# Patient Record
Sex: MALE | Race: WHITE | NOT HISPANIC OR LATINO | Employment: FULL TIME | URBAN - METROPOLITAN AREA
[De-identification: names, ages, dates, MRNs, and addresses within clinical notes are randomized per-mention and may not be internally consistent; named-entity substitution may affect disease eponyms.]

---

## 2021-04-06 DIAGNOSIS — Z23 ENCOUNTER FOR IMMUNIZATION: ICD-10-CM

## 2021-06-17 LAB — HCV AB SER-ACNC: NEGATIVE

## 2023-03-15 NOTE — PROGRESS NOTES
Assessment/Plan:    No problem-specific Assessment & Plan notes found for this encounter  Diagnoses and all orders for this visit:    Acute sinusitis, recurrence not specified, unspecified location  -     amoxicillin-clavulanate (Augmentin) 875-125 mg per tablet; Take 1 tablet by mouth every 12 (twelve) hours for 10 days    Congestion of nasal sinus  -     guaiFENesin (MUCINEX) 600 mg 12 hr tablet; Take 1 tablet (600 mg total) by mouth every 12 (twelve) hours    Acute cough  -     guaiFENesin (MUCINEX) 600 mg 12 hr tablet; Take 1 tablet (600 mg total) by mouth every 12 (twelve) hours    Postnasal drip  -     guaiFENesin (MUCINEX) 600 mg 12 hr tablet; Take 1 tablet (600 mg total) by mouth every 12 (twelve) hours           Patient is a 51-year-old male presenting with sinus pressure, congestion, cough, postnasal drip for the past 6 to 7 days  His symptoms are most consistent with a acute sinusitis versus other upper respiratory infection versus bronchitis  He has had the symptoms lingering which are waxing and waning over the past several months  Due to his current presentation I will treat him for bacterial sinusitis with Augmentin twice daily for 10 days  I will also prescribe patient Mucinex from office supply to start as needed for congestion  Recommend to hydrate frequently with 6 to 8 glasses of water daily  Make sure to have food with antibiotic and do not mix alcohol while on the antibiotic  Patient is in agreement with the plan  Follow-up with regular PCP or in our office if not improving after treatment course  Recommend supportive care with fluids, rest, flonase 1-2 sprays each nostril, otc claritin or zyrtec daily and mucinex as directed  Tylenol recommended prn for pain or fever  Instructed pt RTO if symptoms persist or worsen over the course of the next week    20 minutes spent on exam and plan of care  This note was dictated using software      Subjective:      Patient ID: Jarrett Graykatie Alek Marquez is a 62 y o  male  HPI    The following portions of the patient's history were reviewed and updated as appropriate: allergies, current medications, past family history, past medical history, past social history, past surgical history and problem list     Sinus Pain  Patient complains of congestion, cough, facial pain, nasal congestion, post nasal drip and sinus pressure  Onset of symptoms was 6 days ago  Symptoms have been gradually worsening since that time  He is drinking plenty of fluids  Past history is significant for nothing  Patient is non-smoker  Patient has a history of sinus infections that occur once every year  He has been ill on and off for the past 2 months  He states that he will get sinus type symptoms which will then slowly resolve over time and then come back several days to a week later  This most recent episode has been going on since the start of last weekend and around Friday or Saturday  He states that it has been slowly getting worse and he has facial pressure and starting to have green-yellow purulent discharge  Review of Systems   HENT: Positive for congestion, postnasal drip and sinus pressure  Negative for ear pain and sore throat  Respiratory: Positive for cough  Negative for shortness of breath  Cardiovascular: Negative for chest pain  Gastrointestinal: Negative for abdominal pain, constipation and diarrhea  Skin: Negative for rash  Objective:      /78 (BP Location: Left arm, Patient Position: Sitting, Cuff Size: Standard)   Pulse 77   Temp 97 9 °F (36 6 °C)   Wt 89 kg (196 lb 3 2 oz)   SpO2 98%          Physical Exam  Vitals and nursing note reviewed  Constitutional:       General: He is not in acute distress  Appearance: Normal appearance  He is ill-appearing  HENT:      Head: Normocephalic and atraumatic  Right Ear: Tympanic membrane, ear canal and external ear normal  There is no impacted cerumen        Left Ear: Tympanic membrane, ear canal and external ear normal  There is no impacted cerumen  Nose: Congestion and rhinorrhea present  Mouth/Throat:      Mouth: Mucous membranes are moist       Pharynx: Oropharynx is clear  No oropharyngeal exudate or posterior oropharyngeal erythema  Comments: Postnasal drip  Eyes:      Extraocular Movements: Extraocular movements intact  Conjunctiva/sclera: Conjunctivae normal       Pupils: Pupils are equal, round, and reactive to light  Cardiovascular:      Rate and Rhythm: Normal rate and regular rhythm  Heart sounds: Normal heart sounds  No murmur heard  No friction rub  No gallop  Pulmonary:      Effort: Pulmonary effort is normal  No respiratory distress  Breath sounds: Normal breath sounds  No wheezing  Musculoskeletal:         General: Normal range of motion  Cervical back: Normal range of motion  Skin:     General: Skin is warm and dry  Findings: No rash  Neurological:      General: No focal deficit present  Mental Status: He is alert and oriented to person, place, and time  Mental status is at baseline  Psychiatric:         Mood and Affect: Mood normal          Behavior: Behavior normal          Thought Content:  Thought content normal          Judgment: Judgment normal

## 2023-03-16 ENCOUNTER — OFFICE VISIT (OUTPATIENT)
Age: 58
End: 2023-03-16

## 2023-03-16 ENCOUNTER — TELEPHONE (OUTPATIENT)
Dept: ADMINISTRATIVE | Facility: OTHER | Age: 58
End: 2023-03-16

## 2023-03-16 VITALS
HEART RATE: 77 BPM | TEMPERATURE: 97.9 F | WEIGHT: 196.2 LBS | DIASTOLIC BLOOD PRESSURE: 78 MMHG | OXYGEN SATURATION: 98 % | SYSTOLIC BLOOD PRESSURE: 130 MMHG

## 2023-03-16 DIAGNOSIS — R05.1 ACUTE COUGH: ICD-10-CM

## 2023-03-16 DIAGNOSIS — J01.90 ACUTE SINUSITIS, RECURRENCE NOT SPECIFIED, UNSPECIFIED LOCATION: Primary | ICD-10-CM

## 2023-03-16 DIAGNOSIS — R09.81 CONGESTION OF NASAL SINUS: ICD-10-CM

## 2023-03-16 DIAGNOSIS — R09.82 POSTNASAL DRIP: ICD-10-CM

## 2023-03-16 RX ORDER — GUAIFENESIN 600 MG/1
600 TABLET, EXTENDED RELEASE ORAL EVERY 12 HOURS SCHEDULED
Qty: 20 TABLET | Refills: 0
Start: 2023-03-16

## 2023-03-16 RX ORDER — AMOXICILLIN AND CLAVULANATE POTASSIUM 875; 125 MG/1; MG/1
1 TABLET, FILM COATED ORAL EVERY 12 HOURS SCHEDULED
Qty: 20 TABLET | Refills: 0
Start: 2023-03-16 | End: 2023-03-26

## 2023-03-16 NOTE — TELEPHONE ENCOUNTER
----- Message from [place], [address] sent at 3/16/2023  7:09 AM EDT -----  Regarding: Care Gap request  03/16/23 7:09 AM    Hello, our patient attached above has had Hepatitis C completed/performed  Please assist in updating the patient chart by pulling the Care Everywhere (CE) document  The date of service is 2021         Thank you,  [place]  [facility] [phone] [facility]

## 2023-03-16 NOTE — TELEPHONE ENCOUNTER
Upon review of the In Basket request we were able to locate, review, and update the patient chart as requested for Hepatitis C   Any additional questions or concerns should be emailed to the Practice Liaisons via the appropriate education email address, please do not reply via In Basket      Thank you  Paulo Nageotte, MA

## 2023-07-20 ENCOUNTER — CLINICAL SUPPORT (OUTPATIENT)
Age: 58
End: 2023-07-20

## 2023-07-20 DIAGNOSIS — M25.50 MULTIPLE JOINT PAIN: ICD-10-CM

## 2023-07-20 DIAGNOSIS — E66.3 OVERWEIGHT (BMI 25.0-29.9): ICD-10-CM

## 2023-07-20 DIAGNOSIS — Z00.00 ANNUAL PHYSICAL EXAM: ICD-10-CM

## 2023-07-20 PROCEDURE — 36415 COLL VENOUS BLD VENIPUNCTURE: CPT

## 2023-07-20 NOTE — PROGRESS NOTES
Assessment/Plan:    No problem-specific Assessment & Plan notes found for this encounter. Diagnoses and all orders for this visit:    Tinea cruris  -     clotrimazole-betamethasone (LOTRISONE) 1-0.05 % cream; Apply topically 2 (two) times a day    Rash  -     clotrimazole-betamethasone (LOTRISONE) 1-0.05 % cream; Apply topically 2 (two) times a day    Hyperlipidemia, unspecified hyperlipidemia type  -     ezetimibe (ZETIA) 10 mg tablet; Take 1 tablet (10 mg total) by mouth daily  -     Lipid panel; Future  -     Comprehensive metabolic panel; Future          Patient is a 68-year-old male presenting for rash today and to discuss cholesterol. Will prescribe clotrimazole cream for rash. We discussed managing cholesterol with medication versus diet and exercise. Patient agreed to start zetia. Side effects were discussed in detail. Patient reports a family history of severe side effects to statins as his mother went into liver failure and had extremely high bilirubin and liver enzyme levels. He does not want to go on a statin and risk having those side effects. I recommend a healthy diet and exercise in addition to Zetia. 30 minutes spent on physical exam and plan of care. This note was dictated using software. Subjective:      Patient ID: Ailyn Wray is a 62 y.o. male. HPI    The following portions of the patient's history were reviewed and updated as appropriate: allergies, current medications, past family history, past medical history, past social history, past surgical history and problem list.    Patient presents for evaluation of a rash involving the left medial leg. Rash started last week. Lesions are red, and flat in texture. Rash has not changed over time. Rash causes no discomfort. Associated symptoms: none. Patient denies: congestion, cough and nausea. Patient has not had contacts with similar rash.  Patient has not had new exposures (soaps, lotions, laundry detergents, foods, medications, plants, insects or animals). We also discussed elevated ascvd risk today and options for lowering with medication. Review of Systems   Respiratory: Negative for shortness of breath. Cardiovascular: Negative for chest pain. Gastrointestinal: Negative for abdominal pain, constipation and diarrhea. Skin: Positive for rash. Objective:      /76 (BP Location: Left arm, Patient Position: Sitting, Cuff Size: Standard)   Pulse 66   Temp 98.5 °F (36.9 °C)   Ht 5' 11" (1.803 m)   Wt 86.2 kg (190 lb)   SpO2 99%   BMI 26.50 kg/m²          Physical Exam  Vitals and nursing note reviewed. Constitutional:       General: He is not in acute distress. Appearance: Normal appearance. He is not ill-appearing. HENT:      Head: Normocephalic and atraumatic. Right Ear: External ear normal.      Left Ear: External ear normal.      Nose: Nose normal.      Mouth/Throat:      Mouth: Mucous membranes are moist.   Eyes:      Extraocular Movements: Extraocular movements intact. Conjunctiva/sclera: Conjunctivae normal.      Pupils: Pupils are equal, round, and reactive to light. Cardiovascular:      Rate and Rhythm: Normal rate and regular rhythm. Pulmonary:      Effort: Pulmonary effort is normal. No respiratory distress. Musculoskeletal:         General: Normal range of motion. Cervical back: Normal range of motion. Skin:     General: Skin is warm. Findings: Rash (tinea rash noted to right lower leg) present. Neurological:      General: No focal deficit present. Mental Status: He is alert and oriented to person, place, and time. Mental status is at baseline. Psychiatric:         Mood and Affect: Mood normal.         Behavior: Behavior normal.         Thought Content:  Thought content normal.         Judgment: Judgment normal.

## 2023-07-21 LAB
ALBUMIN SERPL-MCNC: 4.5 G/DL (ref 3.6–5.1)
ALBUMIN/GLOB SERPL: 1.8 (CALC) (ref 1–2.5)
ALP SERPL-CCNC: 72 U/L (ref 35–144)
ALT SERPL-CCNC: 29 U/L (ref 9–46)
ANA SER QL IF: NEGATIVE
AST SERPL-CCNC: 38 U/L (ref 10–35)
BASOPHILS # BLD AUTO: 22 CELLS/UL (ref 0–200)
BASOPHILS NFR BLD AUTO: 0.5 %
BILIRUB SERPL-MCNC: 1.4 MG/DL (ref 0.2–1.2)
BUN SERPL-MCNC: 19 MG/DL (ref 7–25)
BUN/CREAT SERPL: ABNORMAL (CALC) (ref 6–22)
CALCIUM SERPL-MCNC: 9.4 MG/DL (ref 8.6–10.3)
CHLORIDE SERPL-SCNC: 104 MMOL/L (ref 98–110)
CHOLEST SERPL-MCNC: 232 MG/DL
CHOLEST/HDLC SERPL: 4.6 (CALC)
CO2 SERPL-SCNC: 24 MMOL/L (ref 20–32)
CREAT SERPL-MCNC: 1.01 MG/DL (ref 0.7–1.3)
EOSINOPHIL # BLD AUTO: 99 CELLS/UL (ref 15–500)
EOSINOPHIL NFR BLD AUTO: 2.3 %
ERYTHROCYTE [DISTWIDTH] IN BLOOD BY AUTOMATED COUNT: 12.9 % (ref 11–15)
GFR/BSA.PRED SERPLBLD CYS-BASED-ARV: 87 ML/MIN/1.73M2
GLOBULIN SER CALC-MCNC: 2.5 G/DL (CALC) (ref 1.9–3.7)
GLUCOSE SERPL-MCNC: 118 MG/DL (ref 65–99)
HCT VFR BLD AUTO: 49.3 % (ref 38.5–50)
HDLC SERPL-MCNC: 50 MG/DL
HGB BLD-MCNC: 16.1 G/DL (ref 13.2–17.1)
HIV 1+2 AB+HIV1 P24 AG SERPL QL IA: NORMAL
LDLC SERPL CALC-MCNC: 156 MG/DL (CALC)
LYMPHOCYTES # BLD AUTO: 1238 CELLS/UL (ref 850–3900)
LYMPHOCYTES NFR BLD AUTO: 28.8 %
MCH RBC QN AUTO: 31 PG (ref 27–33)
MCHC RBC AUTO-ENTMCNC: 32.7 G/DL (ref 32–36)
MCV RBC AUTO: 94.8 FL (ref 80–100)
MONOCYTES # BLD AUTO: 477 CELLS/UL (ref 200–950)
MONOCYTES NFR BLD AUTO: 11.1 %
NEUTROPHILS # BLD AUTO: 2464 CELLS/UL (ref 1500–7800)
NEUTROPHILS NFR BLD AUTO: 57.3 %
NONHDLC SERPL-MCNC: 182 MG/DL (CALC)
PLATELET # BLD AUTO: 192 THOUSAND/UL (ref 140–400)
PMV BLD REES-ECKER: 10.3 FL (ref 7.5–12.5)
POTASSIUM SERPL-SCNC: 4.9 MMOL/L (ref 3.5–5.3)
PROT SERPL-MCNC: 7 G/DL (ref 6.1–8.1)
PSA SERPL-MCNC: 0.7 NG/ML
RBC # BLD AUTO: 5.2 MILLION/UL (ref 4.2–5.8)
RHEUMATOID FACT SERPL-ACNC: <14 IU/ML
SODIUM SERPL-SCNC: 138 MMOL/L (ref 135–146)
TRIGL SERPL-MCNC: 140 MG/DL
TSH SERPL-ACNC: 1.37 MIU/L (ref 0.4–4.5)
URATE SERPL-MCNC: 5 MG/DL (ref 4–8)
WBC # BLD AUTO: 4.3 THOUSAND/UL (ref 3.8–10.8)

## 2023-07-27 ENCOUNTER — OFFICE VISIT (OUTPATIENT)
Age: 58
End: 2023-07-27

## 2023-07-27 ENCOUNTER — TELEPHONE (OUTPATIENT)
Dept: FAMILY MEDICINE CLINIC | Facility: CLINIC | Age: 58
End: 2023-07-27

## 2023-07-27 VITALS
OXYGEN SATURATION: 99 % | HEART RATE: 66 BPM | TEMPERATURE: 98.5 F | HEIGHT: 71 IN | DIASTOLIC BLOOD PRESSURE: 76 MMHG | WEIGHT: 190 LBS | BODY MASS INDEX: 26.6 KG/M2 | SYSTOLIC BLOOD PRESSURE: 130 MMHG

## 2023-07-27 DIAGNOSIS — B35.6 TINEA CRURIS: Primary | ICD-10-CM

## 2023-07-27 DIAGNOSIS — R21 RASH: ICD-10-CM

## 2023-07-27 DIAGNOSIS — E78.5 HYPERLIPIDEMIA, UNSPECIFIED HYPERLIPIDEMIA TYPE: ICD-10-CM

## 2023-07-27 PROCEDURE — 99213 OFFICE O/P EST LOW 20 MIN: CPT | Performed by: STUDENT IN AN ORGANIZED HEALTH CARE EDUCATION/TRAINING PROGRAM

## 2023-07-27 RX ORDER — EZETIMIBE 10 MG/1
10 TABLET ORAL DAILY
Qty: 90 TABLET | Refills: 3 | Status: SHIPPED | OUTPATIENT
Start: 2023-07-27 | End: 2024-07-21

## 2023-07-27 RX ORDER — CLOTRIMAZOLE AND BETAMETHASONE DIPROPIONATE 10; .64 MG/G; MG/G
CREAM TOPICAL 2 TIMES DAILY
Qty: 30 G | Refills: 0 | Status: SHIPPED | OUTPATIENT
Start: 2023-07-27

## 2023-12-22 LAB
ALBUMIN SERPL-MCNC: 4.4 G/DL (ref 3.6–5.1)
ALBUMIN/GLOB SERPL: 1.9 (CALC) (ref 1–2.5)
ALP SERPL-CCNC: 70 U/L (ref 35–144)
ALT SERPL-CCNC: 17 U/L (ref 9–46)
AST SERPL-CCNC: 19 U/L (ref 10–35)
BILIRUB SERPL-MCNC: 0.9 MG/DL (ref 0.2–1.2)
BUN SERPL-MCNC: 18 MG/DL (ref 7–25)
BUN/CREAT SERPL: NORMAL (CALC) (ref 6–22)
CALCIUM SERPL-MCNC: 9 MG/DL (ref 8.6–10.3)
CHLORIDE SERPL-SCNC: 102 MMOL/L (ref 98–110)
CHOLEST SERPL-MCNC: 176 MG/DL
CHOLEST/HDLC SERPL: 3.1 (CALC)
CO2 SERPL-SCNC: 31 MMOL/L (ref 20–32)
CREAT SERPL-MCNC: 1.12 MG/DL (ref 0.7–1.3)
GFR/BSA.PRED SERPLBLD CYS-BASED-ARV: 76 ML/MIN/1.73M2
GLOBULIN SER CALC-MCNC: 2.3 G/DL (CALC) (ref 1.9–3.7)
GLUCOSE SERPL-MCNC: 96 MG/DL (ref 65–99)
HDLC SERPL-MCNC: 56 MG/DL
LDLC SERPL CALC-MCNC: 107 MG/DL (CALC)
NONHDLC SERPL-MCNC: 120 MG/DL (CALC)
POTASSIUM SERPL-SCNC: 4.8 MMOL/L (ref 3.5–5.3)
PROT SERPL-MCNC: 6.7 G/DL (ref 6.1–8.1)
SODIUM SERPL-SCNC: 137 MMOL/L (ref 135–146)
TRIGL SERPL-MCNC: 52 MG/DL

## 2024-02-02 DIAGNOSIS — E78.5 HYPERLIPIDEMIA, UNSPECIFIED HYPERLIPIDEMIA TYPE: ICD-10-CM

## 2024-02-02 RX ORDER — EZETIMIBE 10 MG/1
10 TABLET ORAL DAILY
Qty: 90 TABLET | Refills: 3 | Status: SHIPPED | OUTPATIENT
Start: 2024-02-02 | End: 2025-01-27

## 2024-04-12 PROBLEM — E78.5 HYPERLIPIDEMIA: Status: ACTIVE | Noted: 2024-04-12

## 2024-04-12 NOTE — PROGRESS NOTES
ADULT ANNUAL PHYSICAL  [facility] IN PARTNERSHIP WITH [facility]    NAME: Henry Tubbs  AGE: 58 y.o. SEX: male  : 1965     DATE: 2024     Assessment and Plan:     Problem List Items Addressed This Visit       Hyperlipidemia     Chronic and stable.  Continue Zetia.         Relevant Orders    Comprehensive metabolic panel    Lipid Panel with Direct LDL reflex    POCT hemoglobin A1c (Completed)    Varicose veins of left lower extremity with pain     On examination today noted left-sided painful varicose veins.  This has been present for some time now and he notes occasional severe episode that feels like cramping.  I recommended a baby aspirin daily.  I will refer patient over to a vascular specialist for evaluation.  He reports that symptoms can radiate up to the groin during the worst episodes.         Relevant Orders    Ambulatory Referral to Vascular Surgery     Other Visit Diagnoses       Annual physical exam    -  Primary    Relevant Orders    Comprehensive metabolic panel    Lipid Panel with Direct LDL reflex    POCT hemoglobin A1c (Completed)    Encounter for vaccination        Up-to-date.    Prostate cancer screening        Relevant Orders    PSA, Total Screen    Overweight (BMI 25.0-29.9)                Immunizations and preventive care screenings were discussed with patient today. Appropriate education was printed on patient's after visit summary.    Discussed risks and benefits of prostate cancer screening. We discussed the controversial history of PSA screening for prostate cancer in the [place] as well as the risk of over detection and over treatment of prostate cancer by way of PSA screening.  The patient understands that PSA blood testing is an imperfect way to screen for prostate cancer and that elevated PSA levels in the blood may also be caused by infection, inflammation, prostatic trauma or manipulation,  urological procedures, or by benign prostatic enlargement.    The role of the digital rectal examination in prostate cancer screening was also discussed and I discussed with him that there is large interobserver variability in the findings of digital rectal examination.    Counseling:  Alcohol/drug use: discussed moderation in alcohol intake, the recommendations for healthy alcohol use, and avoidance of illicit drug use.  Dental Health: discussed importance of regular tooth brushing, flossing, and dental visits.  Exercise: the importance of regular exercise/physical activity was discussed. Recommend exercise 3-5 times per week for at least 30 minutes.       Depression Screening and Follow-up Plan: Patient was screened for depression during today's encounter. They screened negative with a PHQ-2 score of 0.        Patient was seen today for an annual physical exam. Age appropriate screening tests were done as above. Annual labs were ordered to be done through [facility]. Patient will be contacted regarding results and follow up will be based on findings. Patient was counseled on the importance of proper diet and exercise. I recommend diets rich in lean meats and leafy green vegetables. Try to have 150 minutes of exercise weekly at moderate intensity. See problem based charting for any chronic problems or new findings and current workup/management.    40 minutes were spent on chart review, examination, and plan of care. This note was dictated using software.     Return in about 1 year (around 4/22/2025) for Annual physical, nurse visit for labs after July 20th.     Chief Complaint:     Chief Complaint   Patient presents with    Physical Exam      History of Present Illness:     Adult Annual Physical   Patient here for a comprehensive physical exam. The patient reports right leg pains and superficial blood vessels.    Patient has been having some prominent left varicose veins.    Diet and Physical  Activity  Diet/Nutrition:  mixed diet .   Exercise: walking.      Depression Screening  PHQ-2/9 Depression Screening    Little interest or pleasure in doing things: 0 - not at all  Feeling down, depressed, or hopeless: 0 - not at all  PHQ-2 Score: 0  PHQ-2 Interpretation: Negative depression screen       General Health  Sleep:  intermittent sleep issues .   Hearing: normal - bilateral.  Vision: no vision problems.   Dental: regular dental visits.        Health  Symptoms include: none       Review of Systems:     Review of Systems   Respiratory:  Negative for shortness of breath.    Cardiovascular:  Negative for chest pain.   Gastrointestinal:  Negative for abdominal pain, constipation and diarrhea.   Skin:  Negative for rash.   Hematological:         Varicose veins      Past Medical History:     Past Medical History:   Diagnosis Date    Shingles 2001    I have received my shingles vaccine.      Past Surgical History:     Past Surgical History:   Procedure Laterality Date    APPENDECTOMY      CARDIAC CATHETERIZATION      MOUTH SURGERY      ROTATOR CUFF REPAIR        Family History:     Family History   Problem Relation Age of Onset    Hypertension Mother     Liver disease Mother     Hypertension Father     Diabetes Father     Hypertension Sister     Hypertension Brother       Social History:     Social History     Socioeconomic History    Marital status: /Civil Union     Spouse name: None    Number of children: None    Years of education: None    Highest education level: None   Occupational History    None   Tobacco Use    Smoking status: Never    Smokeless tobacco: Never   Vaping Use    Vaping status: Never Used   Substance and Sexual Activity    Alcohol use: Yes     Alcohol/week: 5.0 standard drinks of alcohol     Types: 5 Cans of beer per week     Comment: weekends only    Drug use: Never    Sexual activity: Yes     Partners: Female     Birth control/protection: I.U.D.   Other Topics Concern    None  "  Social History Narrative    None     Social Determinants of Health     Financial Resource Strain: Not on file   Food Insecurity: Not on file   Transportation Needs: Not on file   Physical Activity: Not on file   Stress: Not on file   Social Connections: Not on file   Intimate Partner Violence: Not on file   Housing Stability: Not on file      Current Medications:     Current Outpatient Medications   Medication Sig Dispense Refill    ezetimibe (ZETIA) 10 mg tablet Take 1 tablet (10 mg total) by mouth daily 90 tablet 3     No current facility-administered medications for this visit.      Allergies:     No Known Allergies   Physical Exam:     /80 (BP Location: Left arm, Patient Position: Sitting, Cuff Size: Large)   Pulse 70   Temp 98.4 °F (36.9 °C)   Ht 5' 11\" (1.803 m)   Wt 90.2 kg (198 lb 12.8 oz)   SpO2 99%   BMI 27.73 kg/m²     Physical Exam  Constitutional:       General: He is not in acute distress.     Appearance: Normal appearance.   HENT:      Head: Normocephalic and atraumatic.      Right Ear: Tympanic membrane, ear canal and external ear normal.      Left Ear: Tympanic membrane, ear canal and external ear normal.      Nose: Nose normal. No congestion.      Mouth/Throat:      Mouth: Mucous membranes are moist.      Pharynx: Oropharynx is clear. No oropharyngeal exudate or posterior oropharyngeal erythema.   Eyes:      Extraocular Movements: Extraocular movements intact.      Conjunctiva/sclera: Conjunctivae normal.      Pupils: Pupils are equal, round, and reactive to light.   Cardiovascular:      Rate and Rhythm: Normal rate and regular rhythm.      Heart sounds: Normal heart sounds. No murmur heard.     No friction rub. No gallop.   Pulmonary:      Effort: Pulmonary effort is normal. No respiratory distress.      Breath sounds: Normal breath sounds. No wheezing.   Abdominal:      General: Abdomen is flat.      Palpations: Abdomen is soft.      Tenderness: There is no abdominal tenderness. " There is no guarding.   Musculoskeletal:         General: Normal range of motion.      Cervical back: Normal range of motion.   Skin:     General: Skin is warm and dry.      Findings: No erythema or rash.      Comments: Left varicose veins noted medial thigh superior to the knee   Neurological:      General: No focal deficit present.      Mental Status: He is alert and oriented to person, place, and time. Mental status is at baseline.   Psychiatric:         Mood and Affect: Mood normal.         Behavior: Behavior normal.         Thought Content: Thought content normal.         Judgment: Judgment normal.          Juan Montesinos DO  [facility] IN PARTNERSHIP WITH [facility]

## 2024-04-22 ENCOUNTER — OFFICE VISIT (OUTPATIENT)
Age: 59
End: 2024-04-22

## 2024-04-22 VITALS
WEIGHT: 198.8 LBS | HEIGHT: 71 IN | BODY MASS INDEX: 27.83 KG/M2 | TEMPERATURE: 98.4 F | HEART RATE: 70 BPM | OXYGEN SATURATION: 99 % | DIASTOLIC BLOOD PRESSURE: 80 MMHG | SYSTOLIC BLOOD PRESSURE: 132 MMHG

## 2024-04-22 DIAGNOSIS — I83.812 VARICOSE VEINS OF LEFT LOWER EXTREMITY WITH PAIN: ICD-10-CM

## 2024-04-22 DIAGNOSIS — E66.3 OVERWEIGHT (BMI 25.0-29.9): ICD-10-CM

## 2024-04-22 DIAGNOSIS — E78.5 HYPERLIPIDEMIA, UNSPECIFIED HYPERLIPIDEMIA TYPE: ICD-10-CM

## 2024-04-22 DIAGNOSIS — Z23 ENCOUNTER FOR VACCINATION: ICD-10-CM

## 2024-04-22 DIAGNOSIS — Z00.00 ANNUAL PHYSICAL EXAM: Primary | ICD-10-CM

## 2024-04-22 DIAGNOSIS — Z12.5 PROSTATE CANCER SCREENING: ICD-10-CM

## 2024-04-22 LAB — SL AMB POCT HEMOGLOBIN AIC: 5.1 (ref ?–6.5)

## 2024-04-22 PROCEDURE — 99396 PREV VISIT EST AGE 40-64: CPT | Performed by: STUDENT IN AN ORGANIZED HEALTH CARE EDUCATION/TRAINING PROGRAM

## 2024-04-22 PROCEDURE — 99213 OFFICE O/P EST LOW 20 MIN: CPT | Performed by: STUDENT IN AN ORGANIZED HEALTH CARE EDUCATION/TRAINING PROGRAM

## 2024-04-22 PROCEDURE — 83036 HEMOGLOBIN GLYCOSYLATED A1C: CPT | Performed by: STUDENT IN AN ORGANIZED HEALTH CARE EDUCATION/TRAINING PROGRAM

## 2024-04-22 NOTE — ASSESSMENT & PLAN NOTE
On examination today noted left-sided painful varicose veins.  This has been present for some time now and he notes occasional severe episode that feels like cramping.  I recommended a baby aspirin daily.  I will refer patient over to a vascular specialist for evaluation.  He reports that symptoms can radiate up to the groin during the worst episodes.

## 2024-07-24 ENCOUNTER — CLINICAL SUPPORT (OUTPATIENT)
Age: 59
End: 2024-07-24

## 2024-07-24 DIAGNOSIS — Z12.5 PROSTATE CANCER SCREENING: ICD-10-CM

## 2024-07-24 DIAGNOSIS — Z00.00 ANNUAL PHYSICAL EXAM: ICD-10-CM

## 2024-07-24 DIAGNOSIS — E78.5 HYPERLIPIDEMIA, UNSPECIFIED HYPERLIPIDEMIA TYPE: ICD-10-CM

## 2024-07-24 PROCEDURE — 36415 COLL VENOUS BLD VENIPUNCTURE: CPT

## 2024-07-25 LAB
ALBUMIN SERPL-MCNC: 4.6 G/DL (ref 3.6–5.1)
ALBUMIN/GLOB SERPL: 1.9 (CALC) (ref 1–2.5)
ALP SERPL-CCNC: 80 U/L (ref 35–144)
ALT SERPL-CCNC: 25 U/L (ref 9–46)
AST SERPL-CCNC: 31 U/L (ref 10–35)
BILIRUB SERPL-MCNC: 0.8 MG/DL (ref 0.2–1.2)
BUN SERPL-MCNC: 19 MG/DL (ref 7–25)
BUN/CREAT SERPL: NORMAL (CALC) (ref 6–22)
CALCIUM SERPL-MCNC: 9.2 MG/DL (ref 8.6–10.3)
CHLORIDE SERPL-SCNC: 101 MMOL/L (ref 98–110)
CHOLEST SERPL-MCNC: 198 MG/DL
CHOLEST/HDLC SERPL: 4 (CALC)
CO2 SERPL-SCNC: 31 MMOL/L (ref 20–32)
CREAT SERPL-MCNC: 1.06 MG/DL (ref 0.7–1.3)
GFR/BSA.PRED SERPLBLD CYS-BASED-ARV: 81 ML/MIN/1.73M2
GLOBULIN SER CALC-MCNC: 2.4 G/DL (CALC) (ref 1.9–3.7)
GLUCOSE SERPL-MCNC: 88 MG/DL (ref 65–99)
HDLC SERPL-MCNC: 50 MG/DL
LDLC SERPL CALC-MCNC: 121 MG/DL (CALC)
NONHDLC SERPL-MCNC: 148 MG/DL (CALC)
POTASSIUM SERPL-SCNC: 5 MMOL/L (ref 3.5–5.3)
PROT SERPL-MCNC: 7 G/DL (ref 6.1–8.1)
PSA SERPL-MCNC: 0.87 NG/ML
SODIUM SERPL-SCNC: 139 MMOL/L (ref 135–146)
SPECIMEN SOURCE: NORMAL
TRIGL SERPL-MCNC: 152 MG/DL

## 2024-08-06 ENCOUNTER — CONSULT (OUTPATIENT)
Dept: VASCULAR SURGERY | Facility: CLINIC | Age: 59
End: 2024-08-06
Payer: COMMERCIAL

## 2024-08-06 VITALS
SYSTOLIC BLOOD PRESSURE: 124 MMHG | OXYGEN SATURATION: 99 % | DIASTOLIC BLOOD PRESSURE: 90 MMHG | BODY MASS INDEX: 28.03 KG/M2 | HEART RATE: 66 BPM | HEIGHT: 71 IN | WEIGHT: 200.2 LBS

## 2024-08-06 DIAGNOSIS — I83.813 VARICOSE VEINS OF BILATERAL LOWER EXTREMITIES WITH PAIN: Primary | ICD-10-CM

## 2024-08-06 DIAGNOSIS — I83.812 VARICOSE VEINS OF LEFT LOWER EXTREMITY WITH PAIN: ICD-10-CM

## 2024-08-06 PROCEDURE — 99203 OFFICE O/P NEW LOW 30 MIN: CPT

## 2024-08-06 NOTE — PROGRESS NOTES
Ambulatory Visit  Name: Henry Tubbs      : 1965      MRN: [id number]  Encounter Provider: JANNTETE Payne  Encounter Date: 2024   Encounter department: [facility]    Assessment & Plan   1. Varicose veins of bilateral lower extremities with pain  Assessment & Plan:  Patient reports bulging varicose veins to his left lower extremity that has become more prominent over the last 6 months.  He reports that approximately 5 years ago he sprained his left calf while pushing a tractor.  He did not notice any immediate bulging veins after this incident.  Patient reports occasional cramping to his bilateral lower extremities at night and when changing positions.  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  Patient reports occasional purple discoloration to his bulging varicose veins.  He denies any pain, itching, burning, claudication, numbness/tingling, rest pain, lower extremity swelling, or tissue loss.  Patient reports that he did wear a compression stocking for several months after his left calf sprain, however has not worn compression in several years.    Plan:  -We discussed the pathophysiology of varicose veins as well as contributing lifestyle factors.  -We discussed initiating conservative measures including compression stockings, elevating legs, and regular exercise.  -Patient agreeable to compression stockings. Order placed for compression stockings, apply in the morning and remove before bed.  -As patient is mostly asymptomatic, no indication for further imaging or intervention at this time.  -We discussed signs and symptoms of superficial thrombophlebitis and DVT and indications for further evaluation.  -Patient may follow-up with vascular surgery as needed  Orders:  -     Compression Stocking  2. Varicose veins of left lower extremity with pain  -     Ambulatory Referral to Vascular Surgery      History of Present Illness        Henry Tubbs is a 58 y.o. male, nonsmoker, with PMH HLD. Patient is presenting to the vascular surgery office upon referral by his PCP for the evaluation of left lower extremity varicose veins.    Patient reports bulging varicose veins to his left lower extremity that has become more prominent over the last 6 months.  He reports that approximately 5 years ago he sprained his left calf while pushing a tractor.  He did not notice any immediate bulging veins after this incident.  Patient reports occasional cramping to his bilateral lower extremities at night and when changing positions.  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  Patient reports occasional purple discoloration to his bulging varicose veins.  He denies any pain, itching, burning, claudication, numbness/tingling, rest pain, lower extremity swelling, or tissue loss.  Patient reports that he did wear a compression stocking for several months after his left calf sprain, however has not worn compression in several years.    Patient reports a history of varicose veins in his mother.  He is uncertain if his father had varicose veins.  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  No history of clotting/bleeding disorders.    Review of Systems   Constitutional: Negative.  Negative for activity change.   HENT: Negative.     Eyes: Negative.    Respiratory: Negative.  Negative for shortness of breath.    Cardiovascular: Negative.  Negative for chest pain and leg swelling.   Gastrointestinal: Negative.    Endocrine: Negative.    Genitourinary: Negative.    Musculoskeletal: Negative.    Skin: Negative.  Negative for color change, pallor and wound.   Allergic/Immunologic: Negative.    Neurological: Negative.  Negative for numbness.   Hematological: Negative.    Psychiatric/Behavioral: Negative.       Pertinent Medical History   Past Medical History:  "  Diagnosis Date    Shingles 2001    I have received my shingles vaccine.       Medical History Reviewed by provider this encounter:       Current Outpatient Medications on File Prior to Visit   Medication Sig Dispense Refill    ezetimibe (ZETIA) 10 mg tablet Take 1 tablet (10 mg total) by mouth daily 90 tablet 3     No current facility-administered medications on file prior to visit.      Social History     Tobacco Use    Smoking status: Never    Smokeless tobacco: Never   Vaping Use    Vaping status: Never Used   Substance and Sexual Activity    Alcohol use: Yes     Alcohol/week: 5.0 standard drinks of alcohol     Types: 5 Cans of beer per week     Comment: weekends only    Drug use: Never    Sexual activity: Yes     Partners: Female     Birth control/protection: I.U.D.     Objective     /90 (BP Location: Left arm, Patient Position: Sitting, Cuff Size: Standard)   Pulse 66   Ht 5' 11\" (1.803 m)   Wt 90.8 kg (200 lb 3.2 oz)   SpO2 99%   BMI 27.92 kg/m²     Physical Exam  Vitals and nursing note reviewed.   Constitutional:       General: He is not in acute distress.     Appearance: Normal appearance. He is well-developed.   HENT:      Head: Normocephalic and atraumatic.   Eyes:      Conjunctiva/sclera: Conjunctivae normal.   Neck:      Vascular: No carotid bruit.   Cardiovascular:      Rate and Rhythm: Normal rate and regular rhythm.      Pulses:           Radial pulses are 2+ on the right side and 2+ on the left side.        Dorsalis pedis pulses are 2+ on the right side and 2+ on the left side.        Posterior tibial pulses are 2+ on the right side and 2+ on the left side.      Heart sounds: Normal heart sounds. No murmur heard.  Pulmonary:      Effort: Pulmonary effort is normal. No respiratory distress.      Breath sounds: Normal breath sounds.   Musculoskeletal:         General: No swelling or tenderness.      Cervical back: Normal range of motion and neck supple. No tenderness.      Right lower " leg: No edema.      Left lower leg: No edema.   Skin:     General: Skin is warm and dry.      Capillary Refill: Capillary refill takes less than 2 seconds.      Findings: No lesion, rash or wound.      Comments: Bulging varicose vein left medial thigh and calf   Neurological:      General: No focal deficit present.      Mental Status: He is alert and oriented to person, place, and time.   Psychiatric:         Mood and Affect: Mood normal.         Behavior: Behavior normal.       Administrative Statements   I have spent a total time of 30 minutes in caring for this patient on the day of the visit/encounter including Risks and benefits of tx options, Instructions for management, Patient and family education, Importance of tx compliance, Risk factor reductions, Impressions, Counseling / Coordination of care, Documenting in the medical record, Reviewing / ordering tests, medicine, procedures  , and Obtaining or reviewing history

## 2024-08-06 NOTE — ASSESSMENT & PLAN NOTE
Patient reports bulging varicose veins to his left lower extremity that has become more prominent over the last 6 months.  He reports that approximately 5 years ago he sprained his left calf while pushing a tractor.  He did not notice any immediate bulging veins after this incident.  Patient reports occasional cramping to his bilateral lower extremities at night and when changing positions.  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  Patient reports occasional purple discoloration to his bulging varicose veins.  He denies any pain, itching, burning, claudication, numbness/tingling, rest pain, lower extremity swelling, or tissue loss.  Patient reports that he did wear a compression stocking for several months after his left calf sprain, however has not worn compression in several years.    Plan:  -We discussed the pathophysiology of varicose veins as well as contributing lifestyle factors.  -We discussed initiating conservative measures including compression stockings, elevating legs, and regular exercise.  -Patient agreeable to compression stockings. Order placed for compression stockings, apply in the morning and remove before bed.  -As patient is mostly asymptomatic, no indication for further imaging or intervention at this time.  -We discussed signs and symptoms of superficial thrombophlebitis and DVT and indications for further evaluation.  -Patient may follow-up with vascular surgery as needed

## 2025-04-12 DIAGNOSIS — E78.5 HYPERLIPIDEMIA, UNSPECIFIED HYPERLIPIDEMIA TYPE: ICD-10-CM

## 2025-04-14 RX ORDER — EZETIMIBE 10 MG/1
10 TABLET ORAL DAILY
Qty: 90 TABLET | Refills: 3 | Status: SHIPPED | OUTPATIENT
Start: 2025-04-14 | End: 2026-04-09

## 2025-04-24 ENCOUNTER — OFFICE VISIT (OUTPATIENT)
Age: 60
End: 2025-04-24

## 2025-04-24 VITALS
SYSTOLIC BLOOD PRESSURE: 124 MMHG | DIASTOLIC BLOOD PRESSURE: 82 MMHG | HEART RATE: 77 BPM | OXYGEN SATURATION: 98 % | BODY MASS INDEX: 28.14 KG/M2 | WEIGHT: 201 LBS | HEIGHT: 71 IN

## 2025-04-24 DIAGNOSIS — R09.81 CONGESTION OF NASAL SINUS: ICD-10-CM

## 2025-04-24 DIAGNOSIS — Z00.00 ANNUAL PHYSICAL EXAM: Primary | ICD-10-CM

## 2025-04-24 DIAGNOSIS — J01.90 ACUTE SINUSITIS, RECURRENCE NOT SPECIFIED, UNSPECIFIED LOCATION: ICD-10-CM

## 2025-04-24 DIAGNOSIS — E78.5 HYPERLIPIDEMIA, UNSPECIFIED HYPERLIPIDEMIA TYPE: ICD-10-CM

## 2025-04-24 DIAGNOSIS — R05.1 ACUTE COUGH: ICD-10-CM

## 2025-04-24 DIAGNOSIS — Z23 ENCOUNTER FOR VACCINATION: ICD-10-CM

## 2025-04-24 DIAGNOSIS — Z12.5 PROSTATE CANCER SCREENING: ICD-10-CM

## 2025-04-24 LAB
SARS-COV-2 AG UPPER RESP QL IA: NEGATIVE
SL AMB POCT HEMOGLOBIN AIC: 5 (ref ?–6.5)
VALID CONTROL: NORMAL

## 2025-04-24 PROCEDURE — 87811 SARS-COV-2 COVID19 W/OPTIC: CPT | Performed by: STUDENT IN AN ORGANIZED HEALTH CARE EDUCATION/TRAINING PROGRAM

## 2025-04-24 PROCEDURE — 83036 HEMOGLOBIN GLYCOSYLATED A1C: CPT | Performed by: STUDENT IN AN ORGANIZED HEALTH CARE EDUCATION/TRAINING PROGRAM

## 2025-04-24 PROCEDURE — 99396 PREV VISIT EST AGE 40-64: CPT | Performed by: STUDENT IN AN ORGANIZED HEALTH CARE EDUCATION/TRAINING PROGRAM

## 2025-04-24 PROCEDURE — AMOXICILLIN/POT CLAV AMOXICILLIN/POT CLAV: Performed by: STUDENT IN AN ORGANIZED HEALTH CARE EDUCATION/TRAINING PROGRAM

## 2025-04-24 PROCEDURE — 99213 OFFICE O/P EST LOW 20 MIN: CPT | Performed by: STUDENT IN AN ORGANIZED HEALTH CARE EDUCATION/TRAINING PROGRAM

## 2025-04-24 RX ORDER — ASPIRIN 81 MG/1
81 TABLET, CHEWABLE ORAL DAILY
COMMUNITY

## 2025-04-24 NOTE — PROGRESS NOTES
Adult Annual Physical  Name: Henry Tubbs      : 1965      MRN: [id number]  Encounter Provider: Juan Montesinos DO  Encounter Date: 2025   Encounter department: [facility]IP [facility]  [facility]    :  Assessment & Plan  Annual physical exam    Orders:    CBC and differential; Future    Comprehensive metabolic panel; Future    Lipid Panel with Direct LDL reflex; Future    POCT hemoglobin A1c    Encounter for vaccination  No current vaccinations required.       Prostate cancer screening    Orders:    PSA, Total Screen; Future    Hyperlipidemia, unspecified hyperlipidemia type    Orders:    CBC and differential; Future    Comprehensive metabolic panel; Future    Lipid Panel with Direct LDL reflex; Future    POCT hemoglobin A1c    Acute sinusitis, recurrence not specified, unspecified location  Will start patient on Augmentin twice daily from our office supply.  He has a history of sinus infections and bilateral red nasal turbinates are noted on exam today.  COVID swab was negative.  Continue Allegra daily.  Orders:    amoxicillin-clavulanate (AUGMENTIN) 875-125 mg per tablet; Take 1 tablet by mouth every 12 (twelve) hours for 10 days    Congestion of nasal sinus    Orders:    POCT Rapid Covid Ag    Acute cough    Orders:    POCT Rapid Covid Ag        Preventive Screenings:    - Prostate cancer screening: screening up-to-date     Immunizations:  - Immunizations due: Influenza    BMI Counseling: Body mass index is 28.43 kg/m². The BMI is above normal. Nutrition recommendations include encouraging healthy choices of fruits and vegetables. Exercise recommendations include moderate physical activity 150 minutes/week. Rationale for BMI follow-up plan is due to patient being overweight or obese.     Depression Screening and Follow-up Plan: Patient was screened for depression during today's encounter. They screened negative with a PHQ-2 score of 0.          Patient  was seen today for an annual physical exam. Age appropriate screening tests were done as above. Annual labs were ordered to be done through [facility]. Patient will be contacted regarding results and follow up will be based on findings. Patient was counseled on the importance of proper diet and exercise. I recommend diets rich in lean meats and leafy green vegetables. Try to have 150 minutes of exercise weekly at moderate intensity. See problem based charting for any chronic problems or new findings and current workup/management.    40 minutes were spent on chart review, examination, and plan of care. This note was dictated using software.     History of Present Illness     Adult Annual Physical:  Patient presents for annual physical.     Diet and Physical Activity:  - Diet/Nutrition: no special diet.  - Exercise: walking, moderate cardiovascular exercise, strength training exercises and 3-4 times a week on average. lifting and brisk walking    Depression Screening:  - PHQ-2 Score: 0  - PHQ-9 Score: 0    General Health:  - Sleep: 4-6 hours of sleep on average.  - Hearing: normal hearing right ear and normal hearing left ear.  - Vision: most recent eye exam < 1 year ago.  - Dental: regular dental visits and floss regularly.    /GYN Health:  - Follows with GYN: no.   - History of STDs: no     Health:  - History of STDs: no.   - Urinary symptoms: none.     Advanced Care Planning:  - Has an advanced directive?: yes    - Has a durable medical POA?: yes      Review of Systems   Constitutional:  Negative for fever.   HENT:  Positive for congestion and sinus pressure.    Respiratory:  Positive for cough. Negative for shortness of breath.    Cardiovascular:  Negative for chest pain.   Gastrointestinal:  Negative for abdominal pain, constipation and diarrhea.   Skin:  Negative for rash.   Psychiatric/Behavioral:  Positive for sleep disturbance.      Patient complains of congestion, sinus pressure. Onset of symptoms was 6  "days ago. Symptoms have been gradually worsening since that time. He is drinking plenty of fluids.  Past history is significant for nothing. Patient is non-smoker.    Objective   /82   Pulse 77   Ht 5' 10.5\" (1.791 m)   Wt 91.2 kg (201 lb)   SpO2 98%   BMI 28.43 kg/m²     Physical Exam  Constitutional:       General: He is not in acute distress.     Appearance: Normal appearance.   HENT:      Head: Normocephalic and atraumatic.      Right Ear: Tympanic membrane, ear canal and external ear normal.      Left Ear: Tympanic membrane, ear canal and external ear normal.      Nose: Congestion present.      Comments: Red nasal turbinates bilaterally     Mouth/Throat:      Mouth: Mucous membranes are moist.      Pharynx: Oropharynx is clear. No oropharyngeal exudate or posterior oropharyngeal erythema.   Eyes:      Extraocular Movements: Extraocular movements intact.      Conjunctiva/sclera: Conjunctivae normal.      Pupils: Pupils are equal, round, and reactive to light.   Cardiovascular:      Rate and Rhythm: Normal rate and regular rhythm.      Heart sounds: Normal heart sounds. No murmur heard.     No friction rub. No gallop.   Pulmonary:      Effort: Pulmonary effort is normal. No respiratory distress.      Breath sounds: Normal breath sounds. No wheezing.   Abdominal:      General: Abdomen is flat.      Palpations: Abdomen is soft.      Tenderness: There is no abdominal tenderness. There is no guarding.   Musculoskeletal:         General: Normal range of motion.   Lymphadenopathy:      Cervical: No cervical adenopathy.   Skin:     General: Skin is warm and dry.      Findings: No erythema or rash.   Neurological:      General: No focal deficit present.      Mental Status: He is alert and oriented to person, place, and time. Mental status is at baseline.   Psychiatric:         Mood and Affect: Mood normal.         Behavior: Behavior normal.         Thought Content: Thought content normal.         Judgment: " Judgment normal.

## 2025-08-07 ENCOUNTER — CLINICAL SUPPORT (OUTPATIENT)
Age: 60
End: 2025-08-07

## 2025-08-07 DIAGNOSIS — E78.5 HYPERLIPIDEMIA, UNSPECIFIED HYPERLIPIDEMIA TYPE: ICD-10-CM

## 2025-08-07 DIAGNOSIS — Z00.00 ANNUAL PHYSICAL EXAM: ICD-10-CM

## 2025-08-07 DIAGNOSIS — Z12.5 PROSTATE CANCER SCREENING: ICD-10-CM

## 2025-08-07 PROCEDURE — 36415 COLL VENOUS BLD VENIPUNCTURE: CPT

## 2025-08-08 ENCOUNTER — RESULTS FOLLOW-UP (OUTPATIENT)
Dept: FAMILY MEDICINE CLINIC | Facility: CLINIC | Age: 60
End: 2025-08-08

## 2025-08-08 LAB
ALBUMIN SERPL-MCNC: 4.7 G/DL (ref 3.6–5.1)
ALBUMIN/GLOB SERPL: 2.1 (CALC) (ref 1–2.5)
ALP SERPL-CCNC: 77 U/L (ref 35–144)
ALT SERPL-CCNC: 19 U/L (ref 9–46)
AST SERPL-CCNC: 20 U/L (ref 10–35)
BASOPHILS # BLD AUTO: 22 CELLS/UL (ref 0–200)
BASOPHILS NFR BLD AUTO: 0.5 %
BILIRUB SERPL-MCNC: 0.9 MG/DL (ref 0.2–1.2)
BUN SERPL-MCNC: 16 MG/DL (ref 7–25)
BUN/CREAT SERPL: NORMAL (CALC) (ref 6–22)
CALCIUM SERPL-MCNC: 9.5 MG/DL (ref 8.6–10.3)
CHLORIDE SERPL-SCNC: 102 MMOL/L (ref 98–110)
CHOLEST SERPL-MCNC: 189 MG/DL
CHOLEST/HDLC SERPL: 3.7 (CALC)
CO2 SERPL-SCNC: 31 MMOL/L (ref 20–32)
CREAT SERPL-MCNC: 1.05 MG/DL (ref 0.7–1.3)
EOSINOPHIL # BLD AUTO: 88 CELLS/UL (ref 15–500)
EOSINOPHIL NFR BLD AUTO: 2 %
ERYTHROCYTE [DISTWIDTH] IN BLOOD BY AUTOMATED COUNT: 13 % (ref 11–15)
GFR/BSA.PRED SERPLBLD CYS-BASED-ARV: 82 ML/MIN/1.73M2
GLOBULIN SER CALC-MCNC: 2.2 G/DL (CALC) (ref 1.9–3.7)
GLUCOSE SERPL-MCNC: 97 MG/DL (ref 65–99)
HCT VFR BLD AUTO: 47.8 % (ref 38.5–50)
HDLC SERPL-MCNC: 51 MG/DL
HGB BLD-MCNC: 15.8 G/DL (ref 13.2–17.1)
LDLC SERPL CALC-MCNC: 120 MG/DL (CALC)
LYMPHOCYTES # BLD AUTO: 1588 CELLS/UL (ref 850–3900)
LYMPHOCYTES NFR BLD AUTO: 36.1 %
MCH RBC QN AUTO: 31.7 PG (ref 27–33)
MCHC RBC AUTO-ENTMCNC: 33.1 G/DL (ref 32–36)
MCV RBC AUTO: 95.8 FL (ref 80–100)
MONOCYTES # BLD AUTO: 497 CELLS/UL (ref 200–950)
MONOCYTES NFR BLD AUTO: 11.3 %
NEUTROPHILS # BLD AUTO: 2204 CELLS/UL (ref 1500–7800)
NEUTROPHILS NFR BLD AUTO: 50.1 %
NONHDLC SERPL-MCNC: 138 MG/DL (CALC)
PLATELET # BLD AUTO: 186 THOUSAND/UL (ref 140–400)
PMV BLD REES-ECKER: 10.1 FL (ref 7.5–12.5)
POTASSIUM SERPL-SCNC: 4.7 MMOL/L (ref 3.5–5.3)
PROT SERPL-MCNC: 6.9 G/DL (ref 6.1–8.1)
PSA SERPL-MCNC: 0.9 NG/ML
RBC # BLD AUTO: 4.99 MILLION/UL (ref 4.2–5.8)
SODIUM SERPL-SCNC: 140 MMOL/L (ref 135–146)
TRIGL SERPL-MCNC: 85 MG/DL
WBC # BLD AUTO: 4.4 THOUSAND/UL (ref 3.8–10.8)